# Patient Record
Sex: FEMALE | Race: BLACK OR AFRICAN AMERICAN | URBAN - METROPOLITAN AREA
[De-identification: names, ages, dates, MRNs, and addresses within clinical notes are randomized per-mention and may not be internally consistent; named-entity substitution may affect disease eponyms.]

---

## 2020-01-09 ENCOUNTER — OFFICE VISIT (OUTPATIENT)
Dept: FAMILY MEDICINE CLINIC | Age: 30
End: 2020-01-09

## 2020-01-09 VITALS
TEMPERATURE: 97.4 F | WEIGHT: 147 LBS | HEART RATE: 68 BPM | DIASTOLIC BLOOD PRESSURE: 60 MMHG | RESPIRATION RATE: 16 BRPM | SYSTOLIC BLOOD PRESSURE: 98 MMHG

## 2020-01-09 DIAGNOSIS — H04.203 WATERY EYES: ICD-10-CM

## 2020-01-09 DIAGNOSIS — H47.619 DISORDER OF VISUAL CORTEX ASSOCIATED WITH CORTICAL BLINDNESS, UNSPECIFIED LATERALITY: ICD-10-CM

## 2020-01-09 DIAGNOSIS — J30.89 ENVIRONMENTAL AND SEASONAL ALLERGIES: ICD-10-CM

## 2020-01-09 DIAGNOSIS — Z99.3 WHEELCHAIR BOUND: ICD-10-CM

## 2020-01-09 DIAGNOSIS — Z98.2 VP (VENTRICULOPERITONEAL) SHUNT STATUS: ICD-10-CM

## 2020-01-09 DIAGNOSIS — G91.9 HYDROCEPHALUS, UNSPECIFIED TYPE (HCC): ICD-10-CM

## 2020-01-09 DIAGNOSIS — G80.9 CEREBRAL PALSY, UNSPECIFIED TYPE (HCC): Primary | ICD-10-CM

## 2020-01-09 DIAGNOSIS — G40.909 SEIZURE DISORDER (HCC): ICD-10-CM

## 2020-01-09 PROBLEM — R15.9 FULL INCONTINENCE OF FECES: Status: ACTIVE | Noted: 2020-01-09

## 2020-01-09 PROBLEM — N39.42 URINARY INCONTINENCE WITHOUT SENSORY AWARENESS: Status: ACTIVE | Noted: 2020-01-09

## 2020-01-09 RX ORDER — LEVETIRACETAM 100 MG/ML
10 SOLUTION ORAL 2 TIMES DAILY
COMMUNITY

## 2020-01-09 RX ORDER — CETIRIZINE HYDROCHLORIDE 1 MG/ML
10 SOLUTION ORAL DAILY
Qty: 1 BOTTLE | Refills: 5 | Status: SHIPPED | OUTPATIENT
Start: 2020-01-09

## 2020-01-09 NOTE — PROGRESS NOTES
OFFICE NOTE (SOAP)      1/9/2020  8:29 AM    Chief Complaint   Patient presents with    New Patient    Medication Refill    Immunization/Injection     needs PPD       SUBJECTIVE:    HPI:   Herminia Jauregui is a 34 y.o. female presenting today for office visit. Establishing care. She has cerebral palsy, blindness, seizures. Follows with neurology- Dr. Renee Dodson. Previous primary care provider with Dr. Dione Bahena in [facility]. She presents today with both her parents. Needs new neurology referral. Family would like to stay local to [place]. She has cerebral palsy, is wheelchair bound, seizure disorder (managed on keppra), hydrocephalus and a  shunt. Needs ramp, shower chair, letter for modifications to the bathroom, electric hospital bed, diapers/incontience supplies (through home care delivery), and referral to physical therapy for general strengthening and stretching (requeseting home PT as transportation is limited). She has aged out of school and will be starting day support in march. She needs PPD for tuberculosis screening, unable to do today because cannot be read here over the weekend. She needs a semi electric hospital bed as she requires postioning of body in ways not feasible with an ordinary bed and for reasons as follows:  Diagnosis of tetraplegic cerebral palsy, spastic paraplegia, hydrocephalus/ventriculoperitoneal shunt placement. She is completely wheelchair bound and needs adjustable height bed for transfer to wheelchair. She requires elevation to the head of the bed more than 30 degrees most of the time due to hydrocephalus (increased pressure in brain) and  shunt to allow drainage and potential problems with aspiration. She is unable to change body position herself and requires frequent changes in body position to prevent pressure ulcers. She would benefit from side rails for safety issues as she has seizure disorder and potential to fall out of bed when seizing.      She is completely wheelchair bound and needs modifications to home including ramp for transport in and out of home, physical modifications to bathroom to make wheelchair accessible, and a shower chair for bathing. Minimal concerns from parents or Beatriz  today. They report she has occasional eye watering and discharge, to which they usually use some airborne to help with. She also has a skin tag on her scalp and dry skin to her feet. Review of Systems   Constitutional: Negative for activity change, appetite change, chills and fever. HENT: Negative for congestion, ear pain and sore throat. Eyes: Positive for discharge and itching. Respiratory: Negative for shortness of breath and wheezing. Cardiovascular: Negative for chest pain and leg swelling. Gastrointestinal: Negative for abdominal pain, constipation, diarrhea and vomiting. Genitourinary: Negative for difficulty urinating. Musculoskeletal: Positive for gait problem. Wheel chair bound, paraplegia, spastic CP    Skin: Negative for color change, pallor and wound. Neurological: Negative for seizures and facial asymmetry. Seizures controlled with keppra   Psychiatric/Behavioral: Negative for agitation.          PHQ Screening   3 most recent PHQ Screens 1/9/2020   Little interest or pleasure in doing things Not at all   Feeling down, depressed, irritable, or hopeless Not at all   Total Score PHQ 2 0   Trouble falling or staying asleep, or sleeping too much Not at all   Feeling tired or having little energy Not at all   Poor appetite, weight loss, or overeating Not at all   Feeling bad about yourself - or that you are a failure or have let yourself or your family down Not at all   Trouble concentrating on things such as school, work, reading, or watching TV Not at all   Moving or speaking so slowly that other people could have noticed; or the opposite being so fidgety that others notice Not at all   Thoughts of being better off dead, or hurting yourself in some way Not at all   PHQ 9 Score 0         History  Past Medical History:   Diagnosis Date    Blindness     Cerebral palsied ([facility]     Hydrocephalus ([facility]     Seizure ([facility] [address] [facility]     Seizures ([facility]      (ventriculoperitoneal) shunt status        Past Surgical History:   Procedure Laterality Date    HX CSF SHUNT Bilateral 12/1992, 3/1993, 11/1993, 4/1993     shunt placement, bilateral    HX TENDON / LIGAMENT TRANSPLANT  1996, 4/2011    'tendon release'       Social History     Socioeconomic History    Marital status: SINGLE     Spouse name: Not on file    Number of children: Not on file    Years of education: Not on file    Highest education level: Not on file   Occupational History    Not on file   Social Needs    Financial resource strain: Not on file    Food insecurity:     Worry: Not on file     Inability: Not on file    Transportation needs:     Medical: Not on file     Non-medical: Not on file   Tobacco Use    Smoking status: Never Smoker    Smokeless tobacco: Never Used   Substance and Sexual Activity    Alcohol use: Never     Frequency: Never    Drug use: Never    Sexual activity: Never   Lifestyle    Physical activity:     Days per week: Not on file     Minutes per session: Not on file    Stress: Not on file   Relationships    Social connections:     Talks on phone: Not on file     Gets together: Not on file     Attends Yazdanism service: Not on file     Active member of club or organization: Not on file     Attends meetings of clubs or organizations: Not on file     Relationship status: Not on file    Intimate partner violence:     Fear of current or ex partner: Not on file     Emotionally abused: Not on file     Physically abused: Not on file     Forced sexual activity: Not on file   Other Topics Concern    Not on file   Social History Narrative    Not on file       Family History   Problem Relation Age of Onset    Hypertension Mother     Hypertension Father  Headache Maternal Grandfather        Allergies no known allergies    Current Outpatient Medications   Medication Sig Dispense Refill    levETIRAcetam (KEPPRA) 100 mg/mL solution Take 10 mg/kg by mouth two (2) times a day. Patient Care Team:  Patient Care Team:  Cas Rivers NP as PCP - General (Nurse Practitioner)      OBJECTIVE:      Physical Exam  Vitals signs and nursing note reviewed. Eyes:      Pupils:      Right eye: Pupil is not reactive. Left eye: Pupil is not reactive. Neck:      Musculoskeletal: Normal range of motion. No muscular tenderness. Comments:  shunt felt to right side of neck  Cardiovascular:      Rate and Rhythm: Regular rhythm. Pulses:           Dorsalis pedis pulses are 1+ on the right side and 1+ on the left side. Heart sounds: Normal heart sounds. Pulmonary:      Effort: Pulmonary effort is normal.      Breath sounds: Normal breath sounds. No wheezing or rhonchi. Musculoskeletal:      Right foot: Decreased range of motion. Deformity present. Left foot: Decreased range of motion. Deformity present. Comments: Range of motion limited by CP with paraplegia    Feet:      Right foot:      Skin integrity: Dry skin present. No skin breakdown or erythema. Toenail Condition: Right toenails are normal.      Left foot:      Skin integrity: Dry skin present. No skin breakdown or erythema. Toenail Condition: Left toenails are normal.      Comments: 1+ edema bilaterally, nonpitting  Lymphadenopathy:      Cervical: No cervical adenopathy. Skin:     General: Skin is warm and dry. Findings: No rash. Neurological:      Mental Status: She is alert. Mental status is at baseline. Sensory: Sensory deficit present. Motor: Weakness present.       Coordination: Coordination abnormal.      Gait: Gait abnormal.   Psychiatric:         Mood and Affect: Mood normal.         Speech: Speech normal.         Behavior: Behavior normal. 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 Family requesting referral to neurology for closer location- lives in [place], cannot travel to [place] for visits. Needs evaluation for seizures as well as hydrocephalus and  shunt. She will be beginning day support and needs to return for nurse visit PPD placement/reading. She is requesting home physical therapy.     - REFERRAL TO NEUROLOGY      Wheelchair bound  Home physical therapy needed due to transportation issues. She has spasticity of muscle and history of tendon release surgery and requires stretching.     - REFERRAL TO PHYSICAL THERAPY      Watery eyes/Environmental and seasonal allergies  Likely allergic, trial daily antihistamine. - cetirizine (ZYRTEC) 1 mg/mL solution; Take 10 mL by mouth daily. Dispense: 1 Bottle; Refill: 5        Orders Placed This Encounter    levETIRAcetam (KEPPRA) 100 mg/mL solution     Sig: Take 10 mg/kg by mouth two (2) times a day. Follow-up and Dispositions    · Return in about 6 months (around 7/9/2020), or if symptoms worsen or fail to improve, for nurse visit for PPD (tuberculosis test). Plan of care reviewed with patient. Patient in agreement with plan and expresses understanding. I have discussed when to anticipate results and how results will be communicated, if applicable. Anticipatory guidance given and questions answered, patient encouraged to call or RTO if further questions or concerns.     Delia Junior NP  01/09/20

## 2020-01-09 NOTE — PROGRESS NOTES
Shakeel Oliveira presents today for   Chief Complaint   Patient presents with    New Patient    Medication Refill    Immunization/Injection     needs PPD       Is someone accompanying this pt? yes    Is the patient using any DME equipment during OV? yes    Depression Screening:  3 most recent PHQ Screens 1/9/2020   Little interest or pleasure in doing things Not at all   Feeling down, depressed, irritable, or hopeless Not at all   Total Score PHQ 2 0   Trouble falling or staying asleep, or sleeping too much Not at all   Feeling tired or having little energy Not at all   Poor appetite, weight loss, or overeating Not at all   Feeling bad about yourself - or that you are a failure or have let yourself or your family down Not at all   Trouble concentrating on things such as school, work, reading, or watching TV Not at all   Moving or speaking so slowly that other people could have noticed; or the opposite being so fidgety that others notice Not at all   Thoughts of being better off dead, or hurting yourself in some way Not at all   PHQ 9 Score 0       Learning Assessment:  No flowsheet data found. Abuse Screening:  No flowsheet data found. Fall Risk  No flowsheet data found. Health Maintenance reviewed and discussed and ordered per Provider. Health Maintenance Due   Topic Date Due    DTaP/Tdap/Td series (1 - Tdap) 07/28/2003    PAP AKA CERVICAL CYTOLOGY  07/28/2013    Influenza Age 9 to Adult  08/01/2019   . Coordination of Care:  1. Have you been to the ER, urgent care clinic since your last visit? Hospitalized since your last visit? no    2. Have you seen or consulted any other health care providers outside of the [address] [place] [address] since your last visit? Include any pap smears or colon screening.  no

## 2020-01-10 ENCOUNTER — TELEPHONE (OUTPATIENT)
Dept: FAMILY MEDICINE CLINIC | Age: 30
End: 2020-01-10

## 2020-01-10 NOTE — TELEPHONE ENCOUNTER
Called patients family member to advise that the letter requested for bathroom modifications is available for .   Patient's mother verbalized understanding

## 2020-01-13 ENCOUNTER — TELEPHONE (OUTPATIENT)
Dept: FAMILY MEDICINE CLINIC | Age: 30
End: 2020-01-13

## 2020-01-13 NOTE — TELEPHONE ENCOUNTER
Giuliana, from [facility], called stating she needs:   height, weight, and  added to orders

## 2020-07-09 ENCOUNTER — VIRTUAL VISIT (OUTPATIENT)
Dept: FAMILY MEDICINE CLINIC | Age: 30
End: 2020-07-09

## 2020-07-09 DIAGNOSIS — G91.9 HYDROCEPHALUS, UNSPECIFIED TYPE (HCC): ICD-10-CM

## 2020-07-09 DIAGNOSIS — H47.619 DISORDER OF VISUAL CORTEX ASSOCIATED WITH CORTICAL BLINDNESS, UNSPECIFIED LATERALITY: ICD-10-CM

## 2020-07-09 DIAGNOSIS — G80.8 TETRAPLEGIC CEREBRAL PALSY (HCC): Primary | ICD-10-CM

## 2020-07-09 DIAGNOSIS — G40.909 SEIZURE DISORDER (HCC): ICD-10-CM

## 2020-07-09 DIAGNOSIS — Z99.3 WHEELCHAIR BOUND: ICD-10-CM

## 2020-07-09 NOTE — PROGRESS NOTES
Penelope Noriega is a 34 y.o. female who was seen by synchronous (real-time) audio-video technology on 7/9/2020 for Follow-up        Assessment & Plan:   Diagnoses and all orders for this visit:    1. Tetraplegic cerebral palsy ([address]    2. 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 Hydrocephalus, unspecified type ([address]    4. Seizure disorder ([address]    5. Wheelchair bound       We will attempt to order correct shower chair- needs shower chair with wheels, adjustable/reclining and with straps to hold patient in. Mother states last shower chair (now broken) was [place] brand (perhaps [employer]?), medium. I spent at least 10 minutes on this visit with this established patient. 712  Subjective:     She has cerebral palsy, blindness, seizures. She lives at home with parents. presents virtually with mother today. Patient viewed on camera, stating hello, smiling, asking how I am. She is in bed. No problems or concerns today, patient has still not seen neurology due to [place] and transportation issues but mother reports she is doing well, no seizure activity and still has refills of keppra. At last visit we ordered a lot of DME supplies for patient- ramp, shower chair, electric hospital bed, incontinence supplies. Mother informs me today that the shower chair ordered, a standard chair, is not appropriate. Because of Alix's cerebal palsy and tetraplegia she has poor muscle tone and is unable to ambulate or sit up unassisted. She needs a shower chair that has wheels, can recline and sit upright and has a strap to hold her up. The shower chair ordered was just standard seat. Mother returned that one to [facility]. Prior to Admission medications    Medication Sig Start Date End Date Taking? Authorizing Provider   levETIRAcetam (KEPPRA) 100 mg/mL solution Take 10 mg/kg by mouth two (2) times a day.     Provider, Historical   cetirizine (ZYRTEC) 1 mg/mL solution Take 10 mL by mouth daily. 1/9/20   Xiao Suero NP     Patient Active Problem List   Diagnosis Code    Cortical blindness H47.619    Cerebral palsy ([address] G80.9    Seizure disorder ([address] G40.909     (ventriculoperitoneal) shunt status Z98.2    Hydrocephalus ([address] G91.9    Wheelchair bound Z99.3    Spastic paraplegia [id number]    Tetraplegic cerebral palsy ([address] G80.8    Urinary incontinence without sensory awareness N39.42    Full incontinence of feces R15.9       Review of Systems   Constitutional: Negative for fever, malaise/fatigue and weight loss. Wheelchair bounmd   HENT: Negative for congestion and hearing loss. Eyes:        Known cortical blindness   Respiratory: Negative for cough. Cardiovascular: Negative for chest pain and leg swelling. Gastrointestinal: Negative for abdominal pain, constipation, diarrhea, melena and vomiting. Genitourinary: Negative for frequency and hematuria. Skin: Negative for rash. Neurological: Negative for speech change, focal weakness and seizures. Psychiatric/Behavioral: Negative for depression. Objective:   No flowsheet data found. General: alert, cooperative, no distress   Mental  status: normal mood, behavior, speech, dress, motor activity, and thought processes, able to follow commands   HENT: NCAT   Neck: no visualized mass   Resp: no respiratory distress   Neuro: no gross deficits   Skin: no discoloration or lesions of concern on visible areas   Psychiatric: normal affect, consistent with stated mood, no evidence of hallucinations         We discussed the expected course, resolution and complications of the diagnosis(es) in detail. Medication risks, benefits, costs, interactions, and alternatives were discussed as indicated. I advised her to contact the office if her condition worsens, changes or fails to improve as anticipated. She expressed understanding with the diagnosis(es) and plan.        Orlando Coughlin, who was evaluated through a patient-initiated, synchronous (real-time) audio-video encounter, and/or her healthcare decision maker, is aware that it is a billable service, with coverage as determined by her insurance carrier. She provided verbal consent to proceed: Yes, and patient identification was verified. It was conducted pursuant to the emergency declaration under the [address] [place] [address] and the [employer] and [employer] General Act. A caregiver was present when appropriate. Ability to conduct physical exam was limited. I was in the office. The patient was at home.       Luis Weston NP

## 2020-07-13 ENCOUNTER — TELEPHONE (OUTPATIENT)
Dept: FAMILY MEDICINE CLINIC | Age: 30
End: 2020-07-13

## 2020-07-13 NOTE — TELEPHONE ENCOUNTER
Please see message    Do you want to try another type of chair or should I try another supply company?     Thank you

## 2020-07-13 NOTE — TELEPHONE ENCOUNTER
[facility] called to inform Leonard Sharif that they don't carry the [employer]OR Aguiar Chair that was ordered for this patient.

## 2020-07-14 NOTE — TELEPHONE ENCOUNTER
Please try to find another supply company. We previously ordered her a standard shower chair which is not suitable to her needs. Patient has cerebral palsy and cannot sit unsupported, she is wheelchair bound. She needs rolling chair with strap. Perhaps call family and ask where previous chair was supplied from? They have an old rolling shower chair that is now broken.

## 2020-07-15 NOTE — TELEPHONE ENCOUNTER
Per [employer] [facility] we need a \"speciality prescription form\"  It needs to say \"PT/OT Speciality Shower Chair Evaluation\"  She said this gets the ball rolling and then it can take a month or so to get it ordered and in the home. The patient has to be evaluated-can be done virtually. Per Mayda Bunch at [facility].  We just need to refax the new script and the demographics.     Thank you

## 2021-09-03 ENCOUNTER — VIRTUAL VISIT (OUTPATIENT)
Dept: FAMILY MEDICINE CLINIC | Age: 31
End: 2021-09-03
Payer: MEDICAID

## 2021-09-03 DIAGNOSIS — G40.909 SEIZURE DISORDER (HCC): ICD-10-CM

## 2021-09-03 DIAGNOSIS — G80.8 TETRAPLEGIC CEREBRAL PALSY (HCC): ICD-10-CM

## 2021-09-03 DIAGNOSIS — B35.4 TINEA CORPORIS: Primary | ICD-10-CM

## 2021-09-03 DIAGNOSIS — H47.619 DISORDER OF VISUAL CORTEX ASSOCIATED WITH CORTICAL BLINDNESS, UNSPECIFIED LATERALITY: ICD-10-CM

## 2021-09-03 DIAGNOSIS — Z99.3 WHEELCHAIR BOUND: ICD-10-CM

## 2021-09-03 PROCEDURE — 99214 OFFICE O/P EST MOD 30 MIN: CPT | Performed by: NURSE PRACTITIONER

## 2021-09-03 RX ORDER — TRIAMCINOLONE ACETONIDE 1 MG/G
CREAM TOPICAL 2 TIMES DAILY
Qty: 45 G | Refills: 0 | Status: SHIPPED | OUTPATIENT
Start: 2021-09-03

## 2021-09-03 RX ORDER — NYSTATIN 100000 [USP'U]/G
POWDER TOPICAL 4 TIMES DAILY
Qty: 60 G | Refills: 0 | Status: SHIPPED | OUTPATIENT
Start: 2021-09-03 | End: 2021-12-14 | Stop reason: SDUPTHER

## 2021-09-03 NOTE — PROGRESS NOTES
Duarte Sadler is a 36 y.o. female who was seen by synchronous (real-time) audio-video technology on 9/3/2021 for Establish Care        Assessment & Plan:   Diagnoses and all orders for this visit:    1. Tinea corporis  -     triamcinolone acetonide (KENALOG) 0.1 % topical cream; Apply  to affected area two (2) times a day. use thin layer  -     nystatin (MYCOSTATIN) powder; Apply  to affected area four (4) times daily. 2. Tetraplegic cerebral palsy ([address]    3. Wheelchair bound    4. 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 Seizure disorder ([facility]        I spent at least 20 minutes on this visit with this established patient. 712  Subjective:     Patient is being seen today to establish care. [facility] PCP was [facility]. Last visit was July 2020. Mother is with daughter today. Mother is going to be her residential sponsor for her daughter and may need some paperwork completed. She is getting this done through the [facility]. She states she was told her daughter need a Tuberculosis, influenza and COVID vaccine (this is part of the paper work on her daughter that need to be completed). Patient have cerebral palsy, blindness, and seizures. She lives at home with her parents. Patient was speaking throughout the visit today and answering questions and smiling appropriately. She is wheelchair bound. Seizures  Patient is followed by neurology Dr. Lopez. Patient is currently Keppra. Patient is doing well and take medication as prescribed. Denies any side effects. Rash  Patient have a red rash under each breast.  Mother denies any open wounds or sores but states she does sweat from the heat built up between the skin and breast.  Educated mother on causes and suggest she keeps the area dry and clearn as possible during the day. Will order nystatin cream and powder. No other concerns today.   Informed mother that medically I see no reason she could not get the tuberculosis, influenza or COVID vaccine but it a personal decision. Informed mother that she would likely not be able to get the vaccines all at once. Prior to Admission medications    Medication Sig Start Date End Date Taking? Authorizing Provider   triamcinolone acetonide (KENALOG) 0.1 % topical cream Apply  to affected area two (2) times a day. use thin layer 9/3/21  Yes Radha Garcia NP   nystatin (MYCOSTATIN) powder Apply  to affected area four (4) times daily. 9/3/21  Yes Radha Garcia NP   levETIRAcetam (KEPPRA) 100 mg/mL solution Take 10 mg/kg by mouth two (2) times a day. Yes Provider, Historical   cetirizine (ZYRTEC) 1 mg/mL solution Take 10 mL by mouth daily. 1/9/20  Yes Xiomara Singer NP     Patient Active Problem List   Diagnosis Code    Cortical blindness H47.619    Cerebral palsy ([address] G80.9    Seizure disorder ([address] G40.909     (ventriculoperitoneal) shunt status Z98.2    Hydrocephalus ([address] G91.9    Wheelchair bound Z99.3    Spastic paraplegia [id number]    Tetraplegic cerebral palsy ([address] G80.8    Urinary incontinence without sensory awareness N39.42    Full incontinence of feces R15.9     Current Outpatient Medications   Medication Sig Dispense Refill    triamcinolone acetonide (KENALOG) 0.1 % topical cream Apply  to affected area two (2) times a day. use thin layer 45 g 0    nystatin (MYCOSTATIN) powder Apply  to affected area four (4) times daily. 60 g 0    levETIRAcetam (KEPPRA) 100 mg/mL solution Take 10 mg/kg by mouth two (2) times a day.  cetirizine (ZYRTEC) 1 mg/mL solution Take 10 mL by mouth daily.  1 Bottle 5     No Known Allergies  Past Surgical History:   Procedure Laterality Date    HX CSF SHUNT Bilateral 12/1992, 3/1993, 11/1993, 4/1993     shunt placement, bilateral    HX GI      HX TENDON / LIGAMENT TRANSPLANT  1996, 4/2011    'tendon release'     Social History     Tobacco Use    Smoking status: Never Smoker    Smokeless tobacco: Never Used   Substance Use Topics    Alcohol use: Never       Review of Systems   Constitutional: Negative for chills, fever and malaise/fatigue. Eyes: Negative. Respiratory: Negative for cough, shortness of breath and wheezing. Cardiovascular: Negative for chest pain, palpitations and leg swelling. Musculoskeletal: Negative. Skin: Negative. Neurological: Negative. Objective:     Patient-Reported Vitals 9/3/2021   Patient-Reported Weight 147   Patient-Reported LMP 8-      General: alert, cooperative, no distress   Mental  status: normal mood, behavior, speech, dress, motor activity, and thought processes, able to follow commands   HENT: NCAT   Neck: no visualized mass   Resp: no respiratory distress   Neuro: no gross deficits   Skin: no discoloration or lesions of concern on visible areas   Psychiatric: normal affect, consistent with stated mood, no evidence of hallucinations     Additional exam findings: We discussed the expected course, resolution and complications of the diagnosis(es) in detail. Medication risks, benefits, costs, interactions, and alternatives were discussed as indicated. I advised her to contact the office if her condition worsens, changes or fails to improve as anticipated. She expressed understanding with the diagnosis(es) and plan. Helena Green, who was evaluated through a patient-initiated, synchronous (real-time) audio-video encounter, and/or her healthcare decision maker, is aware that it is a billable service, with coverage as determined by her insurance carrier. She provided verbal consent to proceed: Yes, and patient identification was verified. It was conducted pursuant to the emergency declaration under the [address] [place] [address] and the [employer] and [employer] General Act. A caregiver was present when appropriate.  Ability to conduct physical exam was limited. I was in the office. The patient was at home. Total time:  30 min.   Tara Noel NP

## 2021-09-03 NOTE — PROGRESS NOTES
Lynn Pena presents today for   Chief Complaint   Patient presents with   [address]       Is someone accompanying this pt? no    Is the patient using any DME equipment during OV? no    Depression Screening:  3 most recent PHQ Screens 9/3/2021   Little interest or pleasure in doing things Not at all   Feeling down, depressed, irritable, or hopeless Not at all   Total Score PHQ 2 0   Trouble falling or staying asleep, or sleeping too much Not at all   Feeling tired or having little energy Not at all   Poor appetite, weight loss, or overeating Not at all   Feeling bad about yourself - or that you are a failure or have let yourself or your family down Not at all   Trouble concentrating on things such as school, work, reading, or watching TV Not at all   Moving or speaking so slowly that other people could have noticed; or the opposite being so fidgety that others notice Not at all   Thoughts of being better off dead, or hurting yourself in some way Not at all   PHQ 9 Score 0   How difficult have these problems made it for you to do your work, take care of your home and get along with others Not difficult at all       Learning Assessment:  Learning Assessment 1/9/2020   PRIMARY LEARNER Child(naina)   HIGHEST LEVEL OF EDUCATION - PRIMARY LEARNER  GRADUATED HIGH SCHOOL OR GED   BARRIERS PRIMARY LEARNER PHYSICAL   CO-LEARNER CAREGIVER Yes   CO-LEARNER NAME Dejuan Hua   PRIMARY LANGUAGE ENGLISH   LEARNER PREFERENCE PRIMARY LISTENING   ANSWERED BY mom       Abuse Screening:  No flowsheet data found. Fall Risk  No flowsheet data found. Health Maintenance reviewed and discussed and ordered per Provider. Health Maintenance Due   Topic Date Due    Hepatitis C Screening  Never done    COVID-19 Vaccine (1) Never done    DTaP/Tdap/Td series (1 - Tdap) Never done    PAP AKA CERVICAL CYTOLOGY  Never done    Flu Vaccine (1) Never done   . Coordination of Care:  1.  Have you been to the ER, urgent care clinic since your last visit? Hospitalized since your last visit? no    2. Have you seen or consulted any other health care providers outside of the [address] [place] [address] since your last visit? Include any pap smears or colon screening.  no      Last  Checked no  Last UDS Checked no  Last Pain contract signed: no

## 2021-09-13 ENCOUNTER — CLINICAL SUPPORT (OUTPATIENT)
Dept: FAMILY MEDICINE CLINIC | Age: 31
End: 2021-09-13
Payer: MEDICAID

## 2021-09-13 DIAGNOSIS — Z11.1 ENCOUNTER FOR PPD TEST: Primary | ICD-10-CM

## 2021-09-13 LAB
MM INDURATION POC: 0 MM (ref 0–5)
PPD POC: NEGATIVE NEGATIVE

## 2021-09-13 NOTE — PROGRESS NOTES
PPD Placement note  Chio Del Castillo, 36 y.o. female is here today for placement of PPD test  Reason for PPD test: Group home  Pt taken PPD test before: yes  Verified in allergy area and with patient that they are not allergic to the products PPD is made of (Phenol or Tween). Yes  Is patient taking any oral or IV steroid medication now or have they taken it in the last month? no  Has the patient ever received the BCG vaccine?: yes  Has the patient been in recent contact with anyone known or suspected of having active TB disease?: no       Date of exposure (if applicable): n       Name of person they were exposed to (if applicable): n  Patient's Country of origin?: n  O: Alert and oriented in NAD. P:  PPD placed on 9/13/2021. Patient advised to return for reading within 48-72 hours.

## 2021-09-15 ENCOUNTER — CLINICAL SUPPORT (OUTPATIENT)
Dept: FAMILY MEDICINE CLINIC | Age: 31
End: 2021-09-15

## 2021-09-15 DIAGNOSIS — Z11.1 ENCOUNTER FOR PPD SKIN TEST READING: Primary | ICD-10-CM

## 2021-09-15 PROCEDURE — 86580 TB INTRADERMAL TEST: CPT | Performed by: FAMILY MEDICINE

## 2021-09-15 NOTE — PROGRESS NOTES
PPD Reading Note  [facility] Result: negative mm induration.   Interpretation: 0  If test not read within 48-72 hours of initial placement, patient advised to repeat in other arm 1-3 weeks after this test.  Allergic reaction: no

## 2021-12-14 ENCOUNTER — OFFICE VISIT (OUTPATIENT)
Dept: FAMILY MEDICINE CLINIC | Age: 31
End: 2021-12-14
Payer: MEDICAID

## 2021-12-14 VITALS
HEART RATE: 63 BPM | BODY MASS INDEX: 34.17 KG/M2 | RESPIRATION RATE: 24 BRPM | HEIGHT: 55 IN | SYSTOLIC BLOOD PRESSURE: 135 MMHG | OXYGEN SATURATION: 98 % | DIASTOLIC BLOOD PRESSURE: 81 MMHG

## 2021-12-14 DIAGNOSIS — Z13.228 SCREENING FOR ENDOCRINE, METABOLIC AND IMMUNITY DISORDER: ICD-10-CM

## 2021-12-14 DIAGNOSIS — B35.4 TINEA CORPORIS: ICD-10-CM

## 2021-12-14 DIAGNOSIS — Z13.6 SCREENING FOR CARDIOVASCULAR CONDITION: ICD-10-CM

## 2021-12-14 DIAGNOSIS — Z23 ENCOUNTER FOR IMMUNIZATION: ICD-10-CM

## 2021-12-14 DIAGNOSIS — Z76.0 MEDICATION REFILL: ICD-10-CM

## 2021-12-14 DIAGNOSIS — Z13.29 SCREENING FOR ENDOCRINE, METABOLIC AND IMMUNITY DISORDER: ICD-10-CM

## 2021-12-14 DIAGNOSIS — Z13.0 SCREENING FOR ENDOCRINE, METABOLIC AND IMMUNITY DISORDER: ICD-10-CM

## 2021-12-14 DIAGNOSIS — Z00.00 ENCOUNTER FOR PHYSICAL EXAMINATION: ICD-10-CM

## 2021-12-14 PROCEDURE — 99395 PREV VISIT EST AGE 18-39: CPT | Performed by: NURSE PRACTITIONER

## 2021-12-14 PROCEDURE — 90686 IIV4 VACC NO PRSV 0.5 ML IM: CPT | Performed by: NURSE PRACTITIONER

## 2021-12-14 RX ORDER — NYSTATIN 100000 [USP'U]/G
POWDER TOPICAL 4 TIMES DAILY
Qty: 60 G | Refills: 0 | Status: SHIPPED | OUTPATIENT
Start: 2021-12-14

## 2021-12-14 NOTE — PROGRESS NOTES
OFFICE NOTE    Ella Huizar is a 36 y.o. female presenting today for the following:  Chief Complaint   Patient presents with    Complete Physical    Other     paperwork      HPI     Patient is here today with her mother to complete paperwork residental spoonsorship. Mother will be considered srinivas sponsor. She is needing paperwork completed for the [address] [place] [address] for admission into this program.  Patient is also seen by a neurologist that follow her for seizures. Form assessment was completed today. Mother need to bring back the 1st sheet of the assessment form to completed the whole assessment. During visit mother called  and they stated no chest xray, EKG or urinalysis was needed. Patient is wheel chair bound unable to bear weight at all due to her Cerebral palsy. Patient have a history of:  Cortical blindness  Cerebral palsy  Seixure disorder  Hydrocephalus  Spastic paraplegia  Tetraplegic cerbal palsy    Lab work ordered today      Review of Systems   Constitutional: Negative for fatigue and fever. HENT: Negative. Eyes:        Cortical blindness   Respiratory: Negative for cough, chest tightness, shortness of breath and wheezing. Cardiovascular: Negative for chest pain and palpitations. Gastrointestinal: Negative. Genitourinary: Negative. Neurological: Positive for seizures (followed by neurology). Negative for dizziness, light-headedness and headaches.          History  Past Medical History:   Diagnosis Date    Blindness     Cerebral palsied ([address]     Hydrocephalus ([address]     Seizure ([address]     Seizures ([address]      (ventriculoperitoneal) shunt status        Past Surgical History:   Procedure Laterality Date    HX CSF SHUNT Bilateral 12/1992, 3/1993, 11/1993, 4/1993     shunt placement, bilateral    HX GI      HX TENDON / LIGAMENT TRANSPLANT  1996, 4/2011    'tendon release'       Social History     Socioeconomic History    Marital status: SINGLE     Spouse name: Not on file    Number of children: Not on file    Years of education: Not on file    Highest education level: Not on file   Occupational History    Not on file   Tobacco Use    Smoking status: Never Smoker    Smokeless tobacco: Never Used   Vaping Use    Vaping Use: Not on file   Substance and Sexual Activity    Alcohol use: Never    Drug use: Never    Sexual activity: Never   Other Topics Concern    Not on file   Social History Narrative    Not on file     Social Determinants of Health     Financial Resource Strain:     Difficulty of Paying Living Expenses: Not on file   Food Insecurity:     Worried About Running Out of Food in the Last Year: Not on file    Jeana of Food in the Last Year: Not on file   Transportation Needs:     Lack of Transportation (Medical): Not on file    Lack of Transportation (Non-Medical): Not on file   Physical Activity:     Days of Exercise per Week: Not on file    Minutes of Exercise per Session: Not on file   Stress:     Feeling of Stress : Not on file   Social Connections:     Frequency of Communication with Friends and Family: Not on file    Frequency of Social Gatherings with Friends and Family: Not on file    Attends Hindu Services: Not on file    Active Member of [address] [place] [address] or Organizations: Not on file    Attends Club or Organization Meetings: Not on file    Marital Status: Not on file   Intimate Partner Violence:     Fear of Current or Ex-Partner: Not on file    Emotionally Abused: Not on file    Physically Abused: Not on file    Sexually Abused: Not on file   Housing Stability:     Unable to Pay for Housing in the Last Year: Not on file    Number of [place] in the Last Year: Not on file    Unstable Housing in the Last Year: Not on file       No Known Allergies    Current Outpatient Medications   Medication Sig Dispense Refill    nystatin (MYCOSTATIN) powder Apply  to affected area four (4) times daily.  60 g 0    levETIRAcetam (KEPPRA) 100 mg/mL solution Take 10 mg/kg by mouth two (2) times a day.  cetirizine (ZYRTEC) 1 mg/mL solution Take 10 mL by mouth daily. 1 Bottle 5    triamcinolone acetonide (KENALOG) 0.1 % topical cream Apply  to affected area two (2) times a day. use thin layer (Patient not taking: Reported on 12/14/2021) 45 g 0         Patient Care Team:  Patient Care Team:  Paige Mckeon NP as PCP - General (Nurse Practitioner)  Paige Mckeon NP as PCP - [facility] Provider      LABS:  No results found for any visits on 12/14/21. RADIOLOGY:  No recent results      Physical Exam  Vitals and nursing note reviewed. Constitutional:       Appearance: Normal appearance. She is well-developed. Eyes:      Pupils: Pupils are equal, round, and reactive to light. Cardiovascular:      Rate and Rhythm: Normal rate and regular rhythm. Heart sounds: Normal heart sounds. Pulmonary:      Effort: Pulmonary effort is normal. No respiratory distress. Breath sounds: Normal breath sounds. No wheezing or rales. Abdominal:      General: Bowel sounds are normal. There is no distension. Palpations: Abdomen is soft. Tenderness: There is no abdominal tenderness. There is no rebound. Musculoskeletal:      Cervical back: Normal range of motion and neck supple. Comments: Patient have cerebral palsy and wheel chair bound   Lymphadenopathy:      Cervical: No cervical adenopathy. Skin:     General: Skin is warm and dry. Neurological:      Mental Status: She is alert. Mental status is at baseline. Deep Tendon Reflexes: Reflexes are normal and symmetric. Psychiatric:         Mood and Affect: Mood normal.           Vitals:    12/14/21 0807   BP: 135/81   Pulse: 63   Resp: 24   SpO2: 98%   Height: 4' 7\" (1.397 m)   PainSc:   0 - No pain   LMP: 11/15/2021         Assessment and Plan    1. Encounter for physical examination      2. Tinea corporis    - nystatin (MYCOSTATIN) powder;  Apply  to affected area four (4) times daily. Dispense: 60 g; Refill: 0    3. Medication refill      4. Encounter for immunization    - INFLUENZA VIRUS VAC QUAD,SPLIT,PRESV FREE SYRINGE IM  - RI IMMUNIZ ADMIN,1 SINGLE/COMB VAC/TOXOID    5. Screening for endocrine, metabolic and immunity disorder    - CBC W/O DIFF; Future  - METABOLIC PANEL, COMPREHENSIVE; Future  - LIPID PANEL; Future  - TSH 3RD GENERATION; Future  - HEMOGLOBIN A1C WITH EAG; Future    6. Screening for cardiovascular condition    - LIPID PANEL; Future      MDM    Procedures      *Plan of care reviewed with patient. Patient in agreement with plan and expresses understanding. All questions answered and patient encouraged to call or RTO if further questions or concerns. Advised patient if symptoms worsen to go to nearest ER or call 911. AVS and recommendations given to patient upon discharge.

## 2021-12-14 NOTE — PROGRESS NOTES
After obtaining consent, and per orders of Dr. Kristal Dangelo, NP, injection of [facility] given by Nicolette Kaye. Patient instructed to remain in clinic for 20 minutes afterwards, and to report any adverse reaction to me immediately.

## 2021-12-16 ENCOUNTER — LAB ONLY (OUTPATIENT)
Dept: FAMILY MEDICINE CLINIC | Age: 31
End: 2021-12-16
Payer: MEDICAID

## 2021-12-16 DIAGNOSIS — Z13.0 SCREENING FOR ENDOCRINE, METABOLIC AND IMMUNITY DISORDER: ICD-10-CM

## 2021-12-16 DIAGNOSIS — Z13.228 SCREENING FOR ENDOCRINE, METABOLIC AND IMMUNITY DISORDER: ICD-10-CM

## 2021-12-16 DIAGNOSIS — Z13.6 SCREENING FOR CARDIOVASCULAR CONDITION: ICD-10-CM

## 2021-12-16 DIAGNOSIS — Z01.89 ENCOUNTER FOR LABORATORY TEST: Primary | ICD-10-CM

## 2021-12-16 DIAGNOSIS — Z13.29 SCREENING FOR ENDOCRINE, METABOLIC AND IMMUNITY DISORDER: ICD-10-CM

## 2021-12-16 PROCEDURE — 36415 COLL VENOUS BLD VENIPUNCTURE: CPT | Performed by: NURSE PRACTITIONER

## 2021-12-16 NOTE — PROGRESS NOTES
Patient in for labs today. Labs ordered by PCP. Patient tolerated well and there are no concerns. Venipuncture to the left hand.

## 2021-12-17 LAB
A-G RATIO,AGRAT: 1.2 RATIO (ref 1.1–2.6)
ALBUMIN SERPL-MCNC: 4.2 G/DL (ref 3.5–5)
ALP SERPL-CCNC: 91 U/L (ref 25–115)
ALT SERPL-CCNC: 11 U/L (ref 5–40)
ANION GAP SERPL CALC-SCNC: 12 MMOL/L (ref 3–15)
AST SERPL W P-5'-P-CCNC: 10 U/L (ref 10–37)
AVG GLU, 10930: 106 MG/DL (ref 91–123)
BILIRUB SERPL-MCNC: 0.3 MG/DL (ref 0.2–1.2)
BUN SERPL-MCNC: 9 MG/DL (ref 6–22)
CALCIUM SERPL-MCNC: 9.4 MG/DL (ref 8.4–10.5)
CHLORIDE SERPL-SCNC: 103 MMOL/L (ref 98–110)
CHOLEST SERPL-MCNC: 191 MG/DL (ref 110–200)
CO2 SERPL-SCNC: 23 MMOL/L (ref 20–32)
CREAT SERPL-MCNC: 0.8 MG/DL (ref 0.5–1.2)
ERYTHROCYTE [DISTWIDTH] IN BLOOD BY AUTOMATED COUNT: 16.2 % (ref 10–15.5)
GFRAA, 66117: >60
GFRNA, 66118: >60
GLOBULIN,GLOB: 3.6 G/DL (ref 2–4)
GLUCOSE SERPL-MCNC: 84 MG/DL (ref 70–99)
HBA1C MFR BLD HPLC: 5.3 % (ref 4.8–5.6)
HCT VFR BLD AUTO: 39.1 % (ref 35.1–46.5)
HDLC SERPL-MCNC: 4.5 MG/DL (ref 0–5)
HDLC SERPL-MCNC: 42 MG/DL
HGB BLD-MCNC: 11.1 G/DL (ref 11.7–15.5)
LDL/HDL RATIO,LDHD: 3
LDLC SERPL CALC-MCNC: 128 MG/DL (ref 50–99)
MCH RBC QN AUTO: 23 PG (ref 26–34)
MCHC RBC AUTO-ENTMCNC: 28 G/DL (ref 31–36)
MCV RBC AUTO: 82 FL (ref 80–99)
NON-HDL CHOLESTEROL, 011976: 149 MG/DL
PLATELET # BLD AUTO: 354 K/UL (ref 140–440)
PMV BLD AUTO: 10.8 FL (ref 9–13)
POTASSIUM SERPL-SCNC: 4.3 MMOL/L (ref 3.5–5.5)
PROT SERPL-MCNC: 7.8 G/DL (ref 6.4–8.3)
RBC # BLD AUTO: 4.75 M/UL (ref 3.8–5.2)
SODIUM SERPL-SCNC: 138 MMOL/L (ref 133–145)
TRIGL SERPL-MCNC: 105 MG/DL (ref 40–149)
TSH SERPL DL<=0.005 MIU/L-ACNC: 2.52 MCU/ML (ref 0.27–4.2)
VLDLC SERPL CALC-MCNC: 21 MG/DL (ref 8–30)
WBC # BLD AUTO: 5.3 K/UL (ref 4–11)

## 2021-12-21 ENCOUNTER — TELEPHONE (OUTPATIENT)
Dept: FAMILY MEDICINE CLINIC | Age: 31
End: 2021-12-21

## 2021-12-21 NOTE — PROGRESS NOTES
Inform patient that labs are reassuring. However LDL cholesterol is slightly elevated at 128 (normal is below 100). No cholesterol medication is needed but would advise to incorporate a healthy lifestyle with diet.

## 2021-12-21 NOTE — TELEPHONE ENCOUNTER
MsMateusz Emanuel Gunnerchikis is calling to see if her paperwork is ready to be picked up. Ms. Emanuel Person can be reached at [phone]. Please advise.  Thank you!!!

## 2022-03-03 ENCOUNTER — TELEPHONE (OUTPATIENT)
Dept: FAMILY MEDICINE CLINIC | Age: 32
End: 2022-03-03

## 2022-03-03 NOTE — TELEPHONE ENCOUNTER
Bere Sanchez from [facility] called to check the status of the fax document.

## 2022-03-18 PROBLEM — G91.9 HYDROCEPHALUS (HCC): Status: ACTIVE | Noted: 2020-01-09

## 2022-03-18 PROBLEM — N39.42 URINARY INCONTINENCE WITHOUT SENSORY AWARENESS: Status: ACTIVE | Noted: 2020-01-09

## 2022-03-19 PROBLEM — G40.909 SEIZURE DISORDER (HCC): Status: ACTIVE | Noted: 2018-07-20

## 2022-03-19 PROBLEM — Z99.3 WHEELCHAIR BOUND: Status: ACTIVE | Noted: 2020-01-09

## 2022-03-20 PROBLEM — R15.9 FULL INCONTINENCE OF FECES: Status: ACTIVE | Noted: 2020-01-09

## 2022-11-23 PROBLEM — Z99.3 WHEELCHAIR DEPENDENCE: Status: ACTIVE | Noted: 2020-01-09

## 2023-05-09 ENCOUNTER — OFFICE VISIT (OUTPATIENT)
Facility: CLINIC | Age: 33
End: 2023-05-09
Payer: MEDICAID

## 2023-05-09 VITALS
SYSTOLIC BLOOD PRESSURE: 107 MMHG | WEIGHT: 150 LBS | TEMPERATURE: 98.2 F | HEIGHT: 56 IN | BODY MASS INDEX: 33.74 KG/M2 | DIASTOLIC BLOOD PRESSURE: 71 MMHG | OXYGEN SATURATION: 98 % | HEART RATE: 96 BPM | RESPIRATION RATE: 17 BRPM

## 2023-05-09 DIAGNOSIS — N39.42 URINARY INCONTINENCE WITHOUT SENSORY AWARENESS: ICD-10-CM

## 2023-05-09 DIAGNOSIS — G80.8 TETRAPLEGIC CEREBRAL PALSY (HCC): ICD-10-CM

## 2023-05-09 DIAGNOSIS — G40.909 SEIZURE DISORDER (HCC): ICD-10-CM

## 2023-05-09 DIAGNOSIS — G80.8 TETRAPLEGIC CEREBRAL PALSY (HCC): Primary | ICD-10-CM

## 2023-05-09 DIAGNOSIS — Z13.220 SCREENING FOR LIPID DISORDERS: ICD-10-CM

## 2023-05-09 DIAGNOSIS — Z76.89 ENCOUNTER TO ESTABLISH CARE: ICD-10-CM

## 2023-05-09 DIAGNOSIS — Z11.59 NEED FOR HEPATITIS C SCREENING TEST: ICD-10-CM

## 2023-05-09 PROCEDURE — 99214 OFFICE O/P EST MOD 30 MIN: CPT | Performed by: NURSE PRACTITIONER

## 2023-05-09 SDOH — ECONOMIC STABILITY: FOOD INSECURITY: WITHIN THE PAST 12 MONTHS, THE FOOD YOU BOUGHT JUST DIDN'T LAST AND YOU DIDN'T HAVE MONEY TO GET MORE.: NEVER TRUE

## 2023-05-09 SDOH — ECONOMIC STABILITY: HOUSING INSECURITY
IN THE LAST 12 MONTHS, WAS THERE A TIME WHEN YOU DID NOT HAVE A STEADY PLACE TO SLEEP OR SLEPT IN A SHELTER (INCLUDING NOW)?: NO

## 2023-05-09 SDOH — ECONOMIC STABILITY: INCOME INSECURITY: HOW HARD IS IT FOR YOU TO PAY FOR THE VERY BASICS LIKE FOOD, HOUSING, MEDICAL CARE, AND HEATING?: NOT HARD AT ALL

## 2023-05-09 SDOH — ECONOMIC STABILITY: FOOD INSECURITY: WITHIN THE PAST 12 MONTHS, YOU WORRIED THAT YOUR FOOD WOULD RUN OUT BEFORE YOU GOT MONEY TO BUY MORE.: NEVER TRUE

## 2023-05-09 ASSESSMENT — PATIENT HEALTH QUESTIONNAIRE - PHQ9
SUM OF ALL RESPONSES TO PHQ9 QUESTIONS 1 & 2: 0
SUM OF ALL RESPONSES TO PHQ QUESTIONS 1-9: 0
SUM OF ALL RESPONSES TO PHQ QUESTIONS 1-9: 0
2. FEELING DOWN, DEPRESSED OR HOPELESS: 0
SUM OF ALL RESPONSES TO PHQ QUESTIONS 1-9: 0
SUM OF ALL RESPONSES TO PHQ QUESTIONS 1-9: 0
1. LITTLE INTEREST OR PLEASURE IN DOING THINGS: 0

## 2023-05-09 NOTE — ASSESSMENT & PLAN NOTE
Continue management per neurology  DME ordered per caregiver's request  Patient is wheelchair-bound, would benefit from new wheelchair with adequate cushion in order to prevent skin breakdown  Also recommend new mattress for hospital bed in order to prevent skin breakdown

## 2023-05-09 NOTE — PROGRESS NOTES
Chief Complaint   Patient presents with    [address] [facility] PCP. Other     Mom states that daughter needs a new wheelchair,and new mattress for hospital. ([facility]) company for patient diapers. Seizures     Seizures and patient is Tetraplegic Cerebral Palsy      Assessment & Plan:     1. Tetraplegic cerebral palsy [facility])  Assessment & Plan:  Continue management per neurology  DME ordered per caregiver's request  Patient is wheelchair-bound, would benefit from new wheelchair with adequate cushion in order to prevent skin breakdown  Also recommend new mattress for hospital bed in order to prevent skin breakdown  Orders:  -     CBC with Auto Differential; Future  -     Comprehensive Metabolic Panel; Future  -     DME Order for (Specify) as OP  2. Seizure disorder [facility])  Assessment & Plan:  Continue management per neuro  Orders:  -     CBC with Auto Differential; Future  -     Comprehensive Metabolic Panel; Future  -     DME Order for (Specify) as OP  3. Urinary incontinence without sensory awareness  Assessment & Plan:  High risk for skin breakdown, adult diapers ordered, recommend frequent changes  Orders:  -     DME Order for (Specify) as OP  4. Screening for lipid disorders  -     Lipid Panel; Future  5. Need for hepatitis C screening test  -     Hepatitis C Antibody; Future  6. Encounter to establish care    Follow-up and Dispositions    Return in about 6 months (around 11/9/2023) for cerebral palsy, seizure disorder, 30 MINUTES. Subjective:     HPI    Patient accompanied today by mother, Cary Martinez, to establish care. She was a previous patient at [facility].     Cerebral Palsy-  Occurred after closed-head injury at 1 months old  Patient requires assistance with all of ADLs, including eating, bathing, hygiene  Patient able to  finger foods occasionally  She is wheelchair-bound and mother is requesting a estephania lift in order to help transfer patient to bed  Mother is
Travel Screening:    Travel Screening       Question Response    In the last 10 days, have you been in contact with someone who was confirmed or suspected to have Coronavirus/COVID-19? --    Have you had a COVID-19 viral test in the last 10 days? No    Do you have any of the following new or worsening symptoms? None of these    Have you traveled internationally or domestically in the last month? No          Travel History   Travel since 04/09/23    No documented travel since 04/09/23          Health Maintenance reviewed and discussed and ordered per Provider. Social Determinants of Health     Tobacco Use: Low Risk     Smoking Tobacco Use: Never    Smokeless Tobacco Use: Never    Passive Exposure: Not on file   Alcohol Use: Not on file   Financial Resource Strain: Low Risk     Difficulty of Paying Living Expenses: Not hard at all   Food Insecurity: No Food Insecurity    Worried About [address] in the Last Year: Never true    [address] [place] in the Last Year: Never true   Transportation Needs: Unknown    Lack of Transportation (Medical): Not on file    Lack of Transportation (Non-Medical): No   Physical Activity: Not on file   Stress: Not on file   Social Connections: Not on file   Intimate Partner Violence: Not on file   Depression: Not at risk    PHQ-2 Score: 0   Housing Stability: Unknown    Unable to Pay for Housing in the Last Year: Not on file    Number of Places Lived in the Last Year: Not on file    Unstable Housing in the Last Year: No        Health Maintenance Due   Topic Date Due    COVID-19 Vaccine (1) Never done    Varicella vaccine (1 of 2 - 2-dose childhood series) Never done    HIV screen  Never done    Hepatitis C screen  Never done    DTaP/Tdap/Td vaccine (1 - Tdap) Never done    Cervical cancer screen  Never done    Depression Screen  12/14/2022   . Coordination of Care:  1. Have you been to the ER, urgent care clinic since your last visit? Hospitalized since your last visit?

## 2023-07-10 ENCOUNTER — TELEPHONE (OUTPATIENT)
Facility: CLINIC | Age: 33
End: 2023-07-10

## 2023-07-10 NOTE — TELEPHONE ENCOUNTER
Patient mother stated that she will call back to give fax number to facility patient uses for DME supplies. Unsure of patient DME facility. Order was placed back in May and guardian has yet to reach back out. I've called 4 times within the time span as well as LVM.

## 2023-10-31 ENCOUNTER — TELEPHONE (OUTPATIENT)
Facility: CLINIC | Age: 33
End: 2023-10-31

## 2023-10-31 DIAGNOSIS — G80.8 TETRAPLEGIC CEREBRAL PALSY (HCC): Primary | ICD-10-CM

## 2023-10-31 DIAGNOSIS — N39.42 URINARY INCONTINENCE WITHOUT SENSORY AWARENESS: ICD-10-CM

## 2024-01-02 ENCOUNTER — OFFICE VISIT (OUTPATIENT)
Facility: CLINIC | Age: 34
End: 2024-01-02

## 2024-01-02 VITALS
HEART RATE: 85 BPM | SYSTOLIC BLOOD PRESSURE: 98 MMHG | TEMPERATURE: 97.5 F | OXYGEN SATURATION: 100 % | DIASTOLIC BLOOD PRESSURE: 78 MMHG

## 2024-01-02 DIAGNOSIS — Z23 NEEDS FLU SHOT: ICD-10-CM

## 2024-01-02 DIAGNOSIS — G40.909 SEIZURE DISORDER (HCC): ICD-10-CM

## 2024-01-02 DIAGNOSIS — G80.8 TETRAPLEGIC CEREBRAL PALSY (HCC): Primary | ICD-10-CM

## 2024-01-02 PROCEDURE — 99214 OFFICE O/P EST MOD 30 MIN: CPT | Performed by: NURSE PRACTITIONER

## 2024-01-02 ASSESSMENT — PATIENT HEALTH QUESTIONNAIRE - PHQ9: DEPRESSION UNABLE TO ASSESS: FUNCTIONAL CAPACITY MOTIVATION LIMITS ACCURACY

## 2024-01-02 NOTE — ASSESSMENT & PLAN NOTE
Recent seizure reported by brother, advised to contact neurology to see if adjustments need to be made to Keppra

## 2024-01-02 NOTE — PROGRESS NOTES
Chief Complaint   Patient presents with    tetraplegic cerebral palsy    Seizures     Assessment & Plan:     1. Tetraplegic cerebral palsy (HCC)  Assessment & Plan:  Continue management per neuro  2. Seizure disorder (HCC)  Assessment & Plan:  Recent seizure reported by brother, advised to contact neurology to see if adjustments need to be made to Keppra  3. Needs flu shot  Comments:  Brother wants to hold off    Follow-up and Dispositions    Return in about 6 months (around 7/2/2024) for cerebral palsy, seizure disorder, 30 MINUTES.       Subjective:     HPI    Patient accompanied today by brother, Poncho    Cerebral Palsy-  Occurred after closed-head injury at 4 months old  Patient requires assistance with all of ADLs, including eating, bathing, hygiene  Patient able to  finger foods occasionally  She is wheelchair-bound and mother is requesting a estephania lift in order to help transfer patient to bed  Mother is the current caregiver for patient  Patient does go to Day Support daily from 8am-4pm  Mother is also requesting a new mattress for hospital bed, current one is ripped  Mother states she also  needs a new wheelchair, has had her current one for over 10 years  Mother said current wheelchair is \"too heavy\" and the seat no longer provides proper cushion  She does have hx of urinary incontinence as result of her condition  Patient uses adult briefs daily, uses 4-5 per day    Seizure Disorder-  Last seizure: this morning, brother unsure how long it lasted  Treatment: Keppra 10 mg/kg BID (solution)  Specialist: she is followed by [facility] once a year    Health Maintenance:  COVID-19 vac - received 2 doses  Flu vac- declines for now    Review of Systems   Unable to perform ROS: Acuity of condition     Objective:   BP 98/78 (Site: Left Lower Arm, Position: Sitting)   Pulse 85   Temp 97.5 °F (36.4 °C) (Temporal)   SpO2 100%     Physical Exam  Vitals and nursing note reviewed.   Constitutional:       
Transportation (Non-Medical): No   Physical Activity: Not on file   Stress: Not on file   Social Connections: Not on file   Intimate Partner Violence: Not on file   Depression: Not at risk (5/9/2023)    PHQ-2     PHQ-2 Score: 0   Housing Stability: Unknown (5/9/2023)    Housing Stability Vital Sign     Unable to Pay for Housing in the Last Year: Not on file     Number of Places Lived in the Last Year: Not on file     Unstable Housing in the Last Year: No   Interpersonal Safety: Not on file   Utilities: Not on file        Health Maintenance Due   Topic Date Due    Hepatitis B vaccine (1 of 3 - 3-dose series) Never done    Varicella vaccine (1 of 2 - 2-dose childhood series) Never done    HIV screen  Never done    Hepatitis C screen  Never done    Cervical cancer screen  Never done    Flu vaccine (1) 08/01/2023    COVID-19 Vaccine (3 - 2023-24 season) 09/01/2023   .      Coordination of Care:  1. Have you been to the ER, urgent care clinic since your last visit? Hospitalized since your last visit? no    2. Have you seen or consulted any other health care providers outside of the [facility] since your last visit? Include any pap smears or colon screening. no

## 2024-01-25 ENCOUNTER — TELEPHONE (OUTPATIENT)
Facility: CLINIC | Age: 34
End: 2024-01-25

## 2024-01-25 NOTE — TELEPHONE ENCOUNTER
Pt mom called stating that she wants jerardo staley to write a letter so her daughter can get her bathroom remodeled and would like a call back and also she is checking the status of her home care del supplies

## 2024-01-26 NOTE — TELEPHONE ENCOUNTER
Patient mother is requesting a letter with diagnosis for patient   to have her bathroom remodeled to fit her needs. Also DME supplies  order was already faxed with confirmation received.

## 2024-02-27 ENCOUNTER — TELEPHONE (OUTPATIENT)
Facility: CLINIC | Age: 34
End: 2024-02-27

## 2024-02-27 NOTE — TELEPHONE ENCOUNTER
A lady named mike called she is a physical therapist from [email or web address] that works with the pt and the mother of patient to get the bathroom modification done at her house the letter that was sent from jerardo staley needs to included her diagnostic code for condition and short medical history

## 2024-02-29 NOTE — TELEPHONE ENCOUNTER
I have spoke with Nettie HOOPER letters need specific dx and more insight than letter previously provided .  New letter done .     Will make mom aware when this is signed  so she can

## 2024-03-01 NOTE — TELEPHONE ENCOUNTER
Signed  , tried to call mom for  however voicemail is full . Letter is upfront for  in red folder .

## 2024-08-08 NOTE — PROGRESS NOTES
No chief complaint on file.    Assessment & Plan:     1. Seizure disorder (HCC)  2. Tetraplegic cerebral palsy (HCC)    6 mos for cerebral palsy, seizures (no labs)  Subjective:     HPI    Patient accompanied today by brother, Poncho    Cerebral Palsy-  Occurred after closed-head injury at 4 months old  Patient requires assistance with all of ADLs, including eating, bathing, hygiene  Patient able to  finger foods occasionally  She is wheelchair-bound and mother is requesting a estephania lift in order to help transfer patient to bed  Mother is the current caregiver for patient  Patient does go to Day Support daily from 8am-4pm  Mother is also requesting a new mattress for hospital bed, current one is ripped  Mother states she also  needs a new wheelchair, has had her current one for over 10 years  Mother said current wheelchair is \"too heavy\" and the seat no longer provides proper cushion  She does have hx of urinary incontinence as result of her condition  Patient uses adult briefs daily, uses 4-5 per day    Seizure Disorder-  Last seizure: this morning, brother unsure how long it lasted  Treatment: Keppra 10 mg/kg BID (solution)  Specialist: [facility]    Health Maintenance:  COVID-19 vac - received 2 doses  Flu vac- declines for now    Review of Systems   Unable to perform ROS: Acuity of condition     Objective:   There were no vitals taken for this visit.    Physical Exam  Vitals and nursing note reviewed.   Constitutional:       General: She is not in acute distress.     Appearance: She is not ill-appearing.   HENT:      Head: Normocephalic and atraumatic.   Cardiovascular:      Rate and Rhythm: Normal rate and regular rhythm.   Pulmonary:      Effort: Pulmonary effort is normal. No respiratory distress.      Breath sounds: No wheezing, rhonchi or rales.   Musculoskeletal:      Comments: Wheelchair-bound   Skin:     General: Skin is warm and dry.   Neurological:

## 2024-08-12 ENCOUNTER — CLINICAL DOCUMENTATION (OUTPATIENT)
Facility: CLINIC | Age: 34
End: 2024-08-12

## 2024-08-12 ENCOUNTER — TELEMEDICINE (OUTPATIENT)
Facility: CLINIC | Age: 34
End: 2024-08-12
Payer: MEDICAID

## 2024-08-12 DIAGNOSIS — Z13.220 SCREENING FOR LIPID DISORDERS: ICD-10-CM

## 2024-08-12 DIAGNOSIS — B37.2 CANDIDAL INTERTRIGO: ICD-10-CM

## 2024-08-12 DIAGNOSIS — Z11.59 NEED FOR HEPATITIS C SCREENING TEST: ICD-10-CM

## 2024-08-12 DIAGNOSIS — G91.9 HYDROCEPHALUS, UNSPECIFIED TYPE (HCC): ICD-10-CM

## 2024-08-12 DIAGNOSIS — G80.8 TETRAPLEGIC CEREBRAL PALSY (HCC): ICD-10-CM

## 2024-08-12 DIAGNOSIS — G40.909 SEIZURE DISORDER (HCC): Primary | ICD-10-CM

## 2024-08-12 PROCEDURE — 99214 OFFICE O/P EST MOD 30 MIN: CPT | Performed by: NURSE PRACTITIONER

## 2024-08-12 RX ORDER — NYSTATIN 100000 [USP'U]/G
POWDER TOPICAL 4 TIMES DAILY
Qty: 60 G | Refills: 1 | Status: SHIPPED | OUTPATIENT
Start: 2024-08-12

## 2024-08-12 RX ORDER — NYSTATIN 100000 U/G
CREAM TOPICAL
Qty: 15 G | Refills: 0 | Status: CANCELLED | OUTPATIENT
Start: 2024-08-12

## 2024-08-12 SDOH — ECONOMIC STABILITY: INCOME INSECURITY: HOW HARD IS IT FOR YOU TO PAY FOR THE VERY BASICS LIKE FOOD, HOUSING, MEDICAL CARE, AND HEATING?: NOT HARD AT ALL

## 2024-08-12 SDOH — ECONOMIC STABILITY: FOOD INSECURITY: WITHIN THE PAST 12 MONTHS, YOU WORRIED THAT YOUR FOOD WOULD RUN OUT BEFORE YOU GOT MONEY TO BUY MORE.: NEVER TRUE

## 2024-08-12 SDOH — ECONOMIC STABILITY: FOOD INSECURITY: WITHIN THE PAST 12 MONTHS, THE FOOD YOU BOUGHT JUST DIDN'T LAST AND YOU DIDN'T HAVE MONEY TO GET MORE.: NEVER TRUE

## 2024-08-12 ASSESSMENT — PATIENT HEALTH QUESTIONNAIRE - PHQ9: DEPRESSION UNABLE TO ASSESS: FUNCTIONAL CAPACITY MOTIVATION LIMITS ACCURACY

## 2024-08-12 NOTE — ASSESSMENT & PLAN NOTE
Continue management per neurology  Old hospital bed no longer functioning properly with broken rails, will order replacement

## 2024-08-12 NOTE — PROGRESS NOTES
Patti Ramos presents today for   Chief Complaint   Patient presents with    Seizures    tetraplegic cerebral palsy    Rash     Mother reports rash under both breast that comes and goes        Is the patient in the [facility] ? yes    Is someone accompanying this pt? Yes, Cyndy (mother)    Is the patient using any DME equipment during OV? yes    Depression Screenin/9/2023     1:32 PM 9/3/2021     1:36 PM 2020     7:59 AM   PHQ-9 Questionaire   Little interest or pleasure in doing things 0 0 0   Feeling down, depressed, or hopeless 0 0 0   Trouble falling or staying asleep, or sleeping too much  0 0   Feeling tired or having little energy  0 0   Poor appetite or overeating  0 0   Feeling bad about yourself - or that you are a failure or have let yourself or your family down  0 0   Trouble concentrating on things, such as reading the newspaper or watching television  0 0   Moving or speaking so slowly that other people could have noticed. Or the opposite - being so fidgety or restless that you have been moving around a lot more than usual  0 0   PHQ-9 Total Score 0 0 0        NGOC 7-Anxiety        No data to display                   Learning Assessment:  No question data found.     Fall Risk       No data to display                   Travel Screening:    Travel Screening       Question Response    Have you been in contact with someone who was sick? No / Unsure    Do you have any of the following new or worsening symptoms? None of these    Have you traveled internationally or domestically in the last month? No          Travel History   Travel since 24    No documented travel since 24          Health Maintenance reviewed and discussed and ordered per Provider.  Transportation Needs: Unknown (2024)    PRAPARE - Transportation     Lack of Transportation (Medical): Not on file     Lack of Transportation (Non-Medical): No      Food Insecurity: No Food Insecurity (2024)    Hunger

## 2024-08-12 NOTE — PROGRESS NOTES
Patti Ramos is a 34 y.o. female who was seen by synchronous (real-time) audio-video technology on 8/12/2024 for Seizures, tetraplegic cerebral palsy, and Rash (Mother reports rash under both breast that comes and goes )    Assessment & Plan:     1. Seizure disorder (HCC)  Assessment & Plan:  Continue management per neurology  Orders:  -     CBC with Auto Differential; Future  -     Comprehensive Metabolic Panel; Future  2. Tetraplegic cerebral palsy (HCC)  Assessment & Plan:  Continue management per neurology  Old hospital bed no longer functioning properly with broken rails, will order replacement  Orders:  -     CBC with Auto Differential; Future  -     Comprehensive Metabolic Panel; Future  -     DME Order for Hospital Bed as OP  3. Candidal intertrigo  Assessment & Plan:  Recurrent, continue nystatin powder, which has been effective per mother  Orders:  -     nystatin (MYCOSTATIN) 437584 UNIT/GM powder; Apply topically 4 times daily, Topical, 4 TIMES DAILY Starting Mon 8/12/2024, Disp-60 g, R-1, Normal  4. Hydrocephalus, unspecified type (HCC)  Assessment & Plan:  Continue management per neurology  5. Screening for lipid disorders  -     Lipid Panel; Future  6. Need for hepatitis C screening test  -     Hepatitis C Antibody; Future     Follow-up and Dispositions    Return in about 6 months (around 2/12/2025) for seizures, cerebral palsy (no labs).       SUBJECTIVE:     HPI    Patient is in the [facility]: Yes    Presents today for routine follow up, accompanied by mother, Cyndy Ramos, who is the primary historian.    Cerebral Palsy-  Occurred after closed-head injury at 4 months old  Patient requires assistance with all of ADLs, including eating, bathing, hygiene  Patient able to  finger foods occasionally  She is wheelchair-bound and mother is requesting a estephania lift in order to help transfer patient to bed  Mother is the current caregiver for patient  Patient does go to Day Support daily from

## 2024-12-16 PROBLEM — E66.811 OBESITY (BMI 30.0-34.9): Status: ACTIVE | Noted: 2024-12-16

## 2024-12-16 PROBLEM — Z00.00 ANNUAL PHYSICAL EXAM: Status: ACTIVE | Noted: 2024-12-16

## 2024-12-17 ENCOUNTER — OFFICE VISIT (OUTPATIENT)
Facility: CLINIC | Age: 34
End: 2024-12-17

## 2024-12-17 VITALS
DIASTOLIC BLOOD PRESSURE: 81 MMHG | SYSTOLIC BLOOD PRESSURE: 119 MMHG | OXYGEN SATURATION: 100 % | TEMPERATURE: 98.2 F | RESPIRATION RATE: 16 BRPM | HEART RATE: 54 BPM

## 2024-12-17 DIAGNOSIS — G91.9 HYDROCEPHALUS, UNSPECIFIED TYPE (HCC): ICD-10-CM

## 2024-12-17 DIAGNOSIS — Z11.1 TUBERCULOSIS SCREENING: ICD-10-CM

## 2024-12-17 DIAGNOSIS — Z00.00 ANNUAL PHYSICAL EXAM: Primary | ICD-10-CM

## 2024-12-17 DIAGNOSIS — Z23 NEEDS FLU SHOT: ICD-10-CM

## 2024-12-17 DIAGNOSIS — G80.8 TETRAPLEGIC CEREBRAL PALSY (HCC): ICD-10-CM

## 2024-12-17 DIAGNOSIS — G40.909 SEIZURE DISORDER (HCC): ICD-10-CM

## 2024-12-17 DIAGNOSIS — Z02.79 MEDICAL CERTIFICATE ISSUANCE: ICD-10-CM

## 2024-12-17 DIAGNOSIS — E66.811 OBESITY (BMI 30.0-34.9): ICD-10-CM

## 2024-12-17 SDOH — ECONOMIC STABILITY: FOOD INSECURITY: WITHIN THE PAST 12 MONTHS, THE FOOD YOU BOUGHT JUST DIDN'T LAST AND YOU DIDN'T HAVE MONEY TO GET MORE.: PATIENT DECLINED

## 2024-12-17 SDOH — ECONOMIC STABILITY: FOOD INSECURITY: WITHIN THE PAST 12 MONTHS, YOU WORRIED THAT YOUR FOOD WOULD RUN OUT BEFORE YOU GOT MONEY TO BUY MORE.: PATIENT DECLINED

## 2024-12-17 SDOH — ECONOMIC STABILITY: INCOME INSECURITY: HOW HARD IS IT FOR YOU TO PAY FOR THE VERY BASICS LIKE FOOD, HOUSING, MEDICAL CARE, AND HEATING?: PATIENT DECLINED

## 2024-12-17 NOTE — PROGRESS NOTES
Patti Causeypresents for   Chief Complaint   Patient presents with    Annual Exam    Obesity    Seizure disorder    Tetraplegic cerebral palsy    Hydrocephalus, unspecified type       Obtained signed  Immunization Consent Form. Patient received  Injection of flu administered in left arm. Verified by Renetta IVY and BIJU Victor that this is the correct immunization/injection. Patient observed for 15 minutes with no adverse reaction.    
Patti Ramos is a 34 y.o. female is seen on 12/17/2024 for Annual Exam; Obesity; Seizure disorder; Tetraplegic cerebral palsy; and Hydrocephalus, unspecified type      Assessment & Plan:     1. Annual physical exam  Assessment & Plan:  Physical activity for a total of 150 minutes per week is recommended, drink plenty of water.  Reduce CHO intake, such as white pastas, white rice, white breads. Avoid fried foods, and eat more green, leafy vegetables, whole grains, and lean proteins.  Discussed recommended screenings and vaccines, advised to complete fasting labs  2. Obesity (BMI 30.0-34.9)  Assessment & Plan:  Physical activity as tolerated  3. Seizure disorder (HCC)  Assessment & Plan:  Continue management per [facility]  4. Tetraplegic cerebral palsy (HCC)  Assessment & Plan:  Continue management per neurology  5. Hydrocephalus, unspecified type (HCC)  Assessment & Plan:  Continue management per neurology  6. Tuberculosis screening  Comments:  PPD done today, due for reading no later than Thursday by 11am, since office will be closed early, will schedule nurse visit  Orders:  -     tuberculin injection 5 Units; 5 Units, IntraDERmal, ONCE, 1 dose, On Tue 12/17/24 at 1115  7. Needs flu shot  -     Influenza, FLUCELVAX Trivalent, (age 6 mo+) IM, Preservative Free, 0.5mL  8. Medical certificate issuance  Comments:  Permanent handicap parking permit form completed today  Cleared for adult day care, note provided today       Follow-up and Dispositions    Return in about 6 months (around 6/17/2025) for seizures, cerebral palsy, no labs.       Subjective:     HPI    Presents today for routine follow up, accompanied by mother, Cyndy Ramos, who is the primary historian.    Social Hx:  Household- lives with mom  ETOH use - none  Caffeine use- none  Recreational drug use- none  Tobacco use - none    Family Hx:  HTN- parents, grandparents both sides, maternal aunts/uncles  Diabetes- father  HLD- maternal 
(CARDIA)     Difficulty of Paying Living Expenses: Patient declined     Housing Stability: Unknown (12/17/2024)    Housing Stability Vital Sign     Unable to Pay for Housing in the Last Year: Not on file     Number of Times Moved in the Last Year: Not on file     Homeless in the Last Year: Patient declined       Did you provide resources if patient requested them? declined      Health Maintenance Due   Topic Date Due    Varicella vaccine (1 of 2 - 13+ 2-dose series) Never done    HIV screen  Never done    Hepatitis C screen  Never done    Hepatitis B vaccine (1 of 3 - 19+ 3-dose series) Never done    Cervical cancer screen  Never done    Depression Screen  05/09/2024    Flu vaccine (1) 08/01/2024    COVID-19 Vaccine (3 - 2023-24 season) 09/01/2024   .        \"Have you been to the ER, urgent care clinic since your last visit?  Hospitalized since your last visit?\"    NO    “Have you seen or consulted any other health care providers outside of [facility] since your last visit?”    YES - When: approximately 3 months ago.  Where and Why: 09/20/24 Neurology .     “Have you had a pap smear?”    NO    No cervical cancer screening on file             Click Here for Release of Records Request

## 2024-12-17 NOTE — PATIENT INSTRUCTIONS
and wear a seat belt in the car.   Where can you learn more?  Go to [email or web address]/patientEd and enter P072 to learn more about \"Well Visit, Ages 18 to 65: Care Instructions.\"  Current as of: March 9, 2022               Content Version: 13.5  © 3664-6240 [employer].   Care instructions adapted under license by [employer]. If you have questions about a medical condition or this instruction, always ask your healthcare professional. [employer] disclaims any warranty or liability for your use of this information.

## 2024-12-19 ENCOUNTER — NURSE ONLY (OUTPATIENT)
Facility: CLINIC | Age: 34
End: 2024-12-19
Payer: MEDICAID

## 2024-12-19 DIAGNOSIS — Z11.1 TUBERCULOSIS SCREENING: Primary | ICD-10-CM

## 2024-12-19 LAB
MM INDURATION, POC: 0 MM (ref 0–5)
PPD, POC: NEGATIVE

## 2024-12-19 PROCEDURE — 86580 TB INTRADERMAL TEST: CPT | Performed by: NURSE PRACTITIONER

## 2024-12-19 NOTE — PROGRESS NOTES
PPD Reading Note  PPD read and results entered in [employer].  Result: Negative mm induration.  Interpretation: 0  If test not read within 48-72 hours of initial placement, patient advised to repeat in other arm 1-3 weeks after this test.  Allergic reaction: no

## 2025-01-15 PROBLEM — Z00.00 ANNUAL PHYSICAL EXAM: Status: RESOLVED | Noted: 2024-12-16 | Resolved: 2025-01-15

## 2025-05-30 ENCOUNTER — TELEPHONE (OUTPATIENT)
Facility: CLINIC | Age: 35
End: 2025-05-30

## 2025-05-30 NOTE — TELEPHONE ENCOUNTER
Called [facility] and spoke with Mee. She states that office notes from December is needed. Office notes faxed to   [phone]

## 2025-05-30 NOTE — TELEPHONE ENCOUNTER
Second request for clinical notes for PT . Medical Equipment requested - need to verf  equip is still needed.